# Patient Record
Sex: FEMALE | NOT HISPANIC OR LATINO | URBAN - METROPOLITAN AREA
[De-identification: names, ages, dates, MRNs, and addresses within clinical notes are randomized per-mention and may not be internally consistent; named-entity substitution may affect disease eponyms.]

---

## 2024-12-13 ENCOUNTER — APPOINTMENT (OUTPATIENT)
Dept: ORTHOPEDIC SURGERY | Facility: HOSPITAL | Age: 19
End: 2024-12-13

## 2024-12-16 ENCOUNTER — OFFICE VISIT (OUTPATIENT)
Dept: ORTHOPEDIC SURGERY | Facility: CLINIC | Age: 19
End: 2024-12-16
Payer: COMMERCIAL

## 2024-12-16 VITALS — WEIGHT: 165 LBS | HEIGHT: 69 IN | BODY MASS INDEX: 24.44 KG/M2

## 2024-12-16 DIAGNOSIS — S89.91XA PCL INJURY, RIGHT, INITIAL ENCOUNTER: Primary | ICD-10-CM

## 2024-12-16 PROCEDURE — 3008F BODY MASS INDEX DOCD: CPT | Performed by: ORTHOPAEDIC SURGERY

## 2024-12-16 PROCEDURE — 99204 OFFICE O/P NEW MOD 45 MIN: CPT | Performed by: ORTHOPAEDIC SURGERY

## 2024-12-16 PROCEDURE — 99214 OFFICE O/P EST MOD 30 MIN: CPT | Performed by: ORTHOPAEDIC SURGERY

## 2024-12-16 ASSESSMENT — PAIN - FUNCTIONAL ASSESSMENT: PAIN_FUNCTIONAL_ASSESSMENT: NO/DENIES PAIN

## 2024-12-17 NOTE — PROGRESS NOTES
PRIMARY CARE PHYSICIAN: Noemi Ortega MD  REFERRING PROVIDER: No referring provider defined for this encounter.     CONSULT ORTHOPAEDIC: Knee Evaluation      SUBJECTIVE  CHIEF COMPLAINT:   Chief Complaint   Patient presents with    Right Knee - Pain        HPI: Janna Bailon is a 19 y.o. patient presenting for a new patient evaluation.     Allyssa is currently in college at [employer], playing club soccer with chronic PCL tear that occurred in 2018.  She was evaluated that time and shared decision making used for conservative treatment for grade 2 PCL ligament injury.  She participated in physical therapy and was prescribed a right knee hinged brace.  She reports some discomfort at the end of a soccer game or after a soccer game in her medial anterior right knee.  She denies any interval injuries.  She denies any sensation of instability.  She denies any pain with daily activities.  She had an MRI performed a few months ago in [facility] (unavailable for review at today's visit) with her orthopedic surgeon (Ck Schilling) discussing degenerative medial meniscus changes.  Will need to discuss treatment options.  She is not currently doing any physical therapy or wearing her hinged knee brace with activities.    REVIEW OF SYSTEMS  Constitutional: See HPI for pain assessment, No significant weight loss, recent trauma  Cardiovascular: No chest pain, shortness of breath  Respiratory: No difficulty breathing, cough  Gastrointestinal: No nausea, vomiting, diarrhea, constipation  Musculoskeletal: Noted in HPI, positive for pain, restricted motion, stiffness  Integumentary: No rashes, easy bruising, redness   Neurological: no numbness or tingling in extremities, no gait disturbances   Psychiatric: No mood changes, memory changes, social issues  Heme/Lymph: no excessive swelling, easy bruising, excessive bleeding  ENT: No hearing changes  Eyes: No vision changes    Past Medical History:   Diagnosis Date     Effusion, right knee 06/06/2018    Effusion of right knee    Other specified health status     No pertinent past medical history    Pain in right knee 06/06/2018    Right knee pain    Personal history of other specified conditions 06/20/2019    History of shortness of breath    Sprain of posterior cruciate ligament of right knee, initial encounter 06/05/2018    Tear of PCL (posterior cruciate ligament) of knee, right, initial encounter    Sprain of posterior cruciate ligament of right knee, subsequent encounter 03/20/2019    Posterior cruciate tear, right, subsequent encounter    Sprain of unspecified site of right knee, initial encounter 04/17/2018    Right knee sprain        No Known Allergies     History reviewed. No pertinent surgical history.     No family history on file.     Social History     Socioeconomic History    Marital status: Single     Spouse name: Not on file    Number of children: Not on file    Years of education: Not on file    Highest education level: Not on file   Occupational History    Not on file   Tobacco Use    Smoking status: Never    Smokeless tobacco: Never   Substance and Sexual Activity    Alcohol use: Not on file    Drug use: Not on file    Sexual activity: Not on file   Other Topics Concern    Not on file   Social History Narrative    Not on file     Social Drivers of Health     Financial Resource Strain: Not on file   Food Insecurity: Not on file   Transportation Needs: Not on file   Physical Activity: Not on file   Stress: Not on file   Social Connections: Not on file   Intimate Partner Violence: Not on file   Housing Stability: Not on file        CURRENT MEDICATIONS:   No current outpatient medications on file.     No current facility-administered medications for this visit.        OBJECTIVE  PHYSICAL EXAM      3/6/2020     4:42 PM 6/29/2020     4:20 PM 8/7/2020     3:13 PM 7/23/2021     1:37 PM 8/3/2021     2:01 PM 12/20/2022    11:02 AM 12/16/2024     3:22 PM   Vitals  "  Systolic 108 100 120 115 110 111    Diastolic 72 64 76 74 70 67    Heart Rate    62      Temp   21.1 °C (70 °F) 36.6 °C (97.9 °F)      Height 1.753 m (5' 9\")  1.74 m (5' 8.5\") 1.74 m (5' 8.5\") 1.74 m (5' 8.5\") 1.727 m (5' 8\") 1.753 m (5' 9\")   Weight (lb) 162  165 162.6 165 160 165   BMI 23.92 kg/m2  24.72 kg/m2 24.36 kg/m2 24.72 kg/m2 24.33 kg/m2 24.37 kg/m2   BSA (m2) 1.89 m2  1.9 m2 1.89 m2 1.9 m2 1.87 m2 1.91 m2   Visit Report       Report      Body mass index is 24.37 kg/m².    19 y.o. year-old in no acute distress. Well nourished. Normal affect. Alert and oriented x 3.   Gait: Normal Tandem. Neutral alignment. No abnormalities of balance or coordination.  Right Knee:  ROM: 0-130 degrees. Negative crepitus.  No effusion.  Good quadriceps contraction. Intact extensor mechanism. Patellar tendon non-tender.  Patella facets non-tender. Negative apprehension. Negative tilt.   Lachman stable. Anterior drawer stable. Pivot negative. Posterior drawer positive.  Stable medial collateral ligament. Stable lateral collateral ligament.   Negative medial joint line tenderness.  Negative Erica's.  Negative lateral joint line tenderness.  Negative Erica's.      Motor Strength: 5 out of 5 in the bilateral lower extremities.  Neuro: L4-S1 sensation intact grossly bilaterally. No clonus. 2+ and symmetric Patella and Achilles bilateral reflexes.  Vascular: 2+ DP/PT pulses bilaterally. Bilateral lower extremity compartments supple.    Imaging:   MRI obtained this year in [place] unavailable for review at time of visit.    ASSESSMENT & PLAN    Impression: 19 y.o. female with chronic PCL tear without significant instability on exam.    Plan:   I explained to the patient the nature of their diagnosis.  I reviewed their imaging studies with them.  We discussed hinged knee brace, she had multiple hinged knee braces shown to her as options to offer additional support of knee to decrease discomfort she feels with competitive sports. We " discussed viscosupplementation injections as well as PRP injections as other conservative treatment modalities.  We discussed starting with conservative treatments and progressing through them prior to discussing surgery especially with no feelings of instability or current limitations at this time.  She will also start physical therapy.  We discussed can happily coordinate care with Dr. Schilling for when she returns to campus.    Follow-Up: Patient will follow-up if symptoms not improving with hinged knee brace or physical therapy for additional treatment modalities (supplementation/PRP).    At the end of the visit, all questions were answered in full. The patient is in agreement with the plan and recommendations. They will call the office with any questions/concerns.    Note dictated with Accela software. Completed without full typed error editing and sent to avoid delay.

## 2024-12-19 ENCOUNTER — APPOINTMENT (OUTPATIENT)
Dept: SPORTS MEDICINE | Facility: HOSPITAL | Age: 19
End: 2024-12-19
Payer: COMMERCIAL

## 2024-12-20 PROBLEM — S89.91XA: Status: ACTIVE | Noted: 2024-12-20

## 2024-12-30 ENCOUNTER — EVALUATION (OUTPATIENT)
Dept: PHYSICAL THERAPY | Facility: HOSPITAL | Age: 19
End: 2024-12-30
Payer: COMMERCIAL

## 2024-12-30 DIAGNOSIS — S89.91XA PCL INJURY, RIGHT, INITIAL ENCOUNTER: Primary | ICD-10-CM

## 2024-12-30 PROCEDURE — 97110 THERAPEUTIC EXERCISES: CPT | Mod: GP

## 2024-12-30 PROCEDURE — 97161 PT EVAL LOW COMPLEX 20 MIN: CPT | Mod: GP

## 2024-12-30 NOTE — PROGRESS NOTES
Physical Therapy  Physical Therapy Orthopedic Evaluation    Patient Name: Janna Bailon  MRN: [id number]  Today's Date: 12/30/2024  Time Calculation  Start Time: 1300  Stop Time: 1410  Time Calculation (min): 70 min    Insurance:  Visit number: 1 of 20  Authorization info: no auth needed  Insurance Type: anthem    General:  Reason for visit: chronic R knee pain  Referred by: Dr. Nair      Current Problem  1. PCL injury, right, initial encounter  Referral to Physical Therapy          Precautions: none       Medical History Form: Reviewed (scanned into chart)    Subjective:     Chief Complaint: Patient presents to clinic with chief complaint of chronic R Knee pain- initially injured in 2018 playing soccer, MRI confirmed PCL tear.involvement, was managed conservatively. She has pain in ant/med aspect of knee after playing soccer. Did have recent MRI out in [place] that shows atrophy of PCL and bone contusion.  States she wore a brace for a long time after hurting it and has been in/out of brace since then. She was having a lot of trouble over the summer with her knee in regards to pain and doing functional tasks- this led her to have the MRI.   Onset Date: 2018  IZABEL: running hurdles, trail leg got stuck and went down on leg    Current Condition:   Same    Pain: 0-6/10  Intermittent in nature  When pain increases to 6/10 only stays this high for a few minutes     Location: front of right knee, whole knee  Description: sharp when running, walking down stairs is dull aching  Aggravating Factors: Running, cutting, going down stairs (states she has been trying to run every day, does about 30 minutes interval runs from 1-7 minutes at a time). Has been running consistently for 2-3 weeks, a few days ago started noticing pain while running.  Relieving Factors:  hinge brace, rest,     Relevant Information (PMH & Previous Tests/Imaging): had MRI in [place]- has report with her- does show some chondral changes in medial  joint as well as chronic PCL tear. Low Iron. Takes Iron supplement and birth control.     Previous Interventions/Treatments: Physical Therapy for the knee in 2021, physical therapy for the ankle in 2023    Prior Level of Function (PLOF)  Patient previously independent with all ADLs  Exercise/Physical Activity: dance team , soccer, lifting  Work/School: [employer], club soccer, plays forward and center mid    Patients Living Environment: Reviewed and no concern    Primary Language: English    There are no spiritual/cultural practices/values/needs that are important to know    Patient's Goal(s) for Therapy: get back to a point where she can play a full soccer season    Red Flags: Do you have any of the following? No  Fever/chills, unexplained weight changes, dizziness/fainting, unexplained change in bowel or bladder functions, unexplained malaise or muscle weakness, night pain/sweats, numbness or tingling    Objective:  Objective       ROM    Knee AROM (Degrees)      (R)  (L)  Flexion: 128  135      Extension: 4  7         Knee PROM (Degrees)    WNL no pain bilaterally    Ankle AROM (Degrees)      (R)  (L)     Dorsiflexion: 10  10                 Strength Testing      Knee    (R)  (L)  Flexion: 4/5  5/5      Extension: 4+/5  5/5         Ankle    (R)  (L)  Plantarflexion: 5  5        Isometric Strength Testing    Quads @ 60 deg knee flexion:  R: 184 (115 % BW)  L: 161 (101 % BW)  Deficit: 13  LSI: 87    HS @ 60 deg knee flexion:  R: 70 (44 % BW)  L: 77 (48 % BW)  Deficit: 9  LSI: 91    HS:Quad Ratio:  R: 38  L: 47          Functional Screening  Squat: WNL, pain free  Lunge: WNL pain free  Lateral Step Down: WNL, pain free  SL Quarter Squat: WNL, pain free  DL jumping- WNL, painfree      Palpation: no pain with palpation      Flexibility: limited on R quad,       Patella Mobility: WNL      Ankle Joint Mobility: WNL      Observation: no noticeable swelling or effusion        Outcome Measures:  Other Measures  Lower Extremity  Funtional Score (LEFS): 76/80     EDUCATION: home exercise program, plan of care, activity modifications, pain management, and injury pathology       Goals: Set and discussed today  Active       PT Problem       PT Goal 1       Start:  12/30/24       Short Term Goals (4-6 weeks):    12/30/2024 Patient to demonstrate independence and compliance with HEP to facilitate progression of deficits and eventual quality self-management of condition.    2. 12/30/2024 Patient to demonstrate functional ROM of the hip, knee, ankle to ensure proper LE kinematics and facilitate return to premorbid level of function without pain.    3. 12/30/2024 Patient to demonstrate and verbalize appropriate LE transverse plane control with therapeutic interventions to ensure appropriate performance and decreased pain with functional and daily tasks.     Long Term Goals (6+ weeks):    4.   12/30/2024 Patient to demonstrate localized muscle strength of at least 5/5 to decrease joint stress and encourage appropriate force dynamics to allow for return to premorbid activities without pain.    5.  12/30/2024 Patient to improve functional outcome (LEFS) score by at least MCID (9pts) to demonstrate improved function with A/IADLs.    6.  12/30/2024 Patient able to return to premorbid level community ambulation without pain.    7.  12/30/2024 Patient able to return to premorbid level of recreational/sport activity, specifically running, without restrictions or complaint of pain.    8.  12/30/2024 Patient to understand and verbalize appropriate continued progression of activity to ensure safe return to full activity.               Plan of care was developed with input and agreement by the patient      Treatment Performed:      Therapeutic Exercise:    20 min  Discussion of walk to jog program  Discussion of HS strengthening exercises    PT Evaluation Time Entry  PT Evaluation (Low) Time Entry: 50    Assessment: Patient presents with signs and symptoms  consistent with chronic R knee pain, resulting in limited participation in pain-free ADLs and inability to perform at their prior level of function. Pt would benefit from physical therapy to address the impairments found & listed previously in the objective section in order to return to safe and pain-free ADLs and prior level of function.       Clinical Presentation: Stable and/or uncomplicated characteristics    Plan:     Planned Interventions include: therapeutic exercise, self-care home management, manual therapy, therapeutic activities, gait training, neuromuscular coordination, vasopneumatic, dry needling, aquatic therapy  Frequency: 1 x Week  Duration: 12 Weeks  Rehab Potential/Prognosis: Good      Meena Clinton, PT

## 2024-12-30 NOTE — LETTER
December 30, 2024    Meena Clinton PT  [address] [facility]  [facility] [address]    Patient: Janna Bailon   YOB: 2005   Date of Visit: 12/30/2024       Dear Anam Nair MD  [address]  [place] [address]    The attached plan of care is being sent to you because your patient’s medical reimbursement requires that you certify the plan of care. Your signature is required to allow uninterrupted insurance coverage.      You may indicate your approval by signing below and faxing this form back to us at Dept Fax: [phone].    Please call Dept: [phone] with any questions or concerns.    Thank you for this referral,        Meena Clinton PT  [facility]  [address] [facility] [id number] [phone]    Payer: Payor: ANTHEM / Plan: ANTHEM HMP / Product Type: *No Product type* /                                                                         Date:     Dear Meena Clinton PT,     Re: Ms. Janna Bailon, MRN:[id number]    I certify that I have reviewed the attached plan of care and it is medically necessary for Ms. Janna Bailon (2005) who is under my care.          ______________________________________                    _________________  Provider name and credentials                                           Date and time                                                                                           Plan of Care 12/30/24   Effective from: 12/30/2024  Effective to: 3/30/2025    Plan ID: [id number]            Participants as of Finalize on 12/30/2024    Name Type Comments Contact Info    Anam Nair MD Referring Provider  [phone]    Meena Clinton PT Physical Therapist  [phone]       Last Plan Note     Author: Meena Clinton PT Status: Incomplete Last edited: 12/30/2024  1:00 PM         Physical Therapy  Physical Therapy Orthopedic Evaluation    Patient  Name: Janna Bailon  MRN: [id number]  Today's Date: 12/30/2024  Time Calculation  Start Time: 1300  Stop Time: 1410  Time Calculation (min): 70 min    Insurance:  Visit number: 1 of 20  Authorization info: no auth needed  Insurance Type: anthem    General:  Reason for visit: chronic R knee pain  Referred by: Dr. Nair      Current Problem  1. PCL injury, right, initial encounter  Referral to Physical Therapy          Precautions: none       Medical History Form: Reviewed (scanned into chart)    Subjective:     Chief Complaint: Patient presents to clinic with chief complaint of chronic R Knee pain- initially injured in 2018 playing soccer, MRI confirmed PCL tear.involvement, was managed conservatively. She has pain in ant/med aspect of knee after playing soccer. Did have recent MRI out in [place] that shows atrophy of PCL and bone contusion.  States she wore a brace for a long time after hurting it and has been in/out of brace since then. She was having a lot of trouble over the summer with her knee in regards to pain and doing functional tasks- this led her to have the MRI.   Onset Date: 2018  IZABEL: running hurdles, trail leg got stuck and went down on leg    Current Condition:   Same    Pain: 0-6/10  Intermittent in nature  When pain increases to 6/10 only stays this high for a few minutes     Location: front of right knee, whole knee  Description: sharp when running, walking down stairs is dull aching  Aggravating Factors: Running, cutting, going down stairs (states she has been trying to run every day, does about 30 minutes interval runs from 1-7 minutes at a time). Has been running consistently for 2-3 weeks, a few days ago started noticing pain while running.  Relieving Factors:  hinge brace, rest,     Relevant Information (PMH & Previous Tests/Imaging): had MRI in [place]- has report with her- does show some chondral changes in medial joint as well as chronic PCL tear. Low Iron. Takes Iron supplement  and birth control.     Previous Interventions/Treatments: Physical Therapy for the knee in 2021, physical therapy for the ankle in 2023    Prior Level of Function (PLOF)  Patient previously independent with all ADLs  Exercise/Physical Activity: dance team , soccer, lifting  Work/School: [employer], club soccer, plays forward and center mid    Patients Living Environment: Reviewed and no concern    Primary Language: English    There are no spiritual/cultural practices/values/needs that are important to know    Patient's Goal(s) for Therapy: get back to a point where she can play a full soccer season    Red Flags: Do you have any of the following? No  Fever/chills, unexplained weight changes, dizziness/fainting, unexplained change in bowel or bladder functions, unexplained malaise or muscle weakness, night pain/sweats, numbness or tingling    Objective:  Objective      ROM    Knee AROM (Degrees)      (R)  (L)  Flexion: 128  135      Extension: 4  7         Knee PROM (Degrees)    WNL no pain bilaterally    Ankle AROM (Degrees)      (R)  (L)     Dorsiflexion: 10  10                 Strength Testing      Knee    (R)  (L)  Flexion: 4/5  5/5      Extension: 4+/5  5/5         Ankle    (R)  (L)  Plantarflexion: 5  5        Isometric Strength Testing    Quads @ 60 deg knee flexion:  R: 184 (115 % BW)  L: 161 (101 % BW)  Deficit: 13  LSI: 87    HS @ 60 deg knee flexion:  R: 70 (44 % BW)  L: 77 (48 % BW)  Deficit: 9  LSI: 91    HS:Quad Ratio:  R: 38  L: 47          Functional Screening  Squat: WNL, pain free  Lunge: WNL pain free  Lateral Step Down: WNL, pain free  SL Quarter Squat: WNL, pain free  DL jumping- WNL, painfree      Palpation: no pain with palpation      Flexibility: limited on R quad,       Patella Mobility: WNL      Ankle Joint Mobility: WNL      Observation: no noticeable swelling or effusion        Outcome Measures:  Other Measures  Lower Extremity Funtional Score (LEFS): 76/80     EDUCATION: home exercise program,  plan of care, activity modifications, pain management, and injury pathology       Goals: Set and discussed today  Active       PT Problem       PT Goal 1       Start:  12/30/24       Short Term Goals (4-6 weeks):    12/30/2024 Patient to demonstrate independence and compliance with HEP to facilitate progression of deficits and eventual quality self-management of condition.    2. 12/30/2024 Patient to demonstrate functional ROM of the hip, knee, ankle to ensure proper LE kinematics and facilitate return to premorbid level of function without pain.    3. 12/30/2024 Patient to demonstrate and verbalize appropriate LE transverse plane control with therapeutic interventions to ensure appropriate performance and decreased pain with functional and daily tasks.     Long Term Goals (6+ weeks):    4.   12/30/2024 Patient to demonstrate localized muscle strength of at least 5/5 to decrease joint stress and encourage appropriate force dynamics to allow for return to premorbid activities without pain.    5.  12/30/2024 Patient to improve functional outcome (LEFS) score by at least MCID (9pts) to demonstrate improved function with A/IADLs.    6.  12/30/2024 Patient able to return to premorbid level community ambulation without pain.    7.  12/30/2024 Patient able to return to premorbid level of recreational/sport activity, specifically running, without restrictions or complaint of pain.    8.  12/30/2024 Patient to understand and verbalize appropriate continued progression of activity to ensure safe return to full activity.               Plan of care was developed with input and agreement by the patient      Treatment Performed:      Therapeutic Exercise:    20 min  Discussion of walk to jog program  Discussion of HS strengthening exercises    PT Evaluation Time Entry  PT Evaluation (Low) Time Entry: 50    Assessment: Patient presents with signs and symptoms consistent with chronic R knee pain, resulting in limited participation  in pain-free ADLs and inability to perform at their prior level of function. Pt would benefit from physical therapy to address the impairments found & listed previously in the objective section in order to return to safe and pain-free ADLs and prior level of function.       Clinical Presentation: Stable and/or uncomplicated characteristics    Plan:     Planned Interventions include: therapeutic exercise, self-care home management, manual therapy, therapeutic activities, gait training, neuromuscular coordination, vasopneumatic, dry needling, aquatic therapy  Frequency: 1 x Week  Duration: 12 Weeks  Rehab Potential/Prognosis: Good      Meena Clinton PT           Current Participants as of 12/30/2024    Name Type Comments Contact Info    Anam Nair MD Referring Provider  [phone]    Signature pending    Meena Clinton PT Physical Therapist  [phone]    Electronically signed by Meena Clinton PT at 12/30/2024 1208 EST

## 2025-01-02 ENCOUNTER — TREATMENT (OUTPATIENT)
Dept: PHYSICAL THERAPY | Facility: HOSPITAL | Age: 20
End: 2025-01-02
Payer: COMMERCIAL

## 2025-01-02 DIAGNOSIS — S89.91XA PCL INJURY, RIGHT, INITIAL ENCOUNTER: Primary | ICD-10-CM

## 2025-01-02 PROCEDURE — 97530 THERAPEUTIC ACTIVITIES: CPT | Mod: GP

## 2025-01-02 PROCEDURE — 97110 THERAPEUTIC EXERCISES: CPT | Mod: GP

## 2025-01-02 NOTE — PROGRESS NOTES
Physical Therapy  Physical Therapy Treatment Note    Patient Name: Janna Bailon  MRN: [id number]  Today's Date: 1/2/2025  Time Calculation  Start Time: 1330  Stop Time: 1430  Time Calculation (min): 60 min    Insurance:  Visit number: 2 of 20  Authorization info: no auth needed  Insurance Type: anthem    General:  Reason for visit: chronic R knee pain  Referred by: Dr. Nair    Current Problem  1. PCL injury, right, initial encounter            Precautions: none      Subjective:     Visit #2: Patient reports compliance with walk to jog program, walking 2, jogging 3 for 4 rounds through. Denies pain coming into today's session- no pain with walk to jog program. Has done it twice since Monday.    Pain   0/10    Performing HEP?: Yes      Objective:     Isometric Strength Testing     Quads @ 60 deg knee flexion:  R: 184 (115 % BW)  L: 161 (101 % BW)  Deficit: 13  LSI: 87     HS @ 60 deg knee flexion:  R: 70 (44 % BW)  L: 77 (48 % BW)  Deficit: 9  LSI: 91     HS:Quad Ratio:  R: 38  L: 47    Isokinetic Strength Testing    Peak Torque Quads @ 60 deg/sec (goal for males: 100-125%, females: %)  R: 175 (109 % BW)  L: 164 (102 % BW)  Deficit: 6  LSI: 94    Peak Torque HS @ 60 deg/sec (goals for males: 60% BW, females: 50%)  R: 89 (56 % BW)  L: 92 (58 % BW)  Deficit: 3   LSI: 97    HS:Quad Ratio @ 60 deg/s (goals for males: 65%, females: 75%)  R: 51  L: 56    Peak Torque Quads @ 180 deg/sec  R: 103 (64 % BW)  L: 112 (70 % BW)  Deficit: 8  LSI: 92    Peak Torque HS @ 180 deg/sec  R: 71 (44 % BW)  L: 71 (44 % BW)  Deficit: 0  LSI: 100     HS:Quad Ratio @ 180 deg/sec  R: 69  L: 63    Force Plate Counter Movement Jump     Pass: Yes    Metric (passing LSI/score) Score Pass   L/R Propulsive Impulse Index LSI (>=95%)   95 Yes   L/R Braking Impulse Index LSI (>=90%)   85 No   [place] Force LSI (>=90%)   99 Yes   Peak breaking velocity (<=-1.2 (m/s))                   Treatment Performed:    Therapeutic Exercise:    35  min  Upright bike 5 minutes  Side steps with BTB, monster walks BTB 2x10 steps  SL fire hydrant BTB 3x10 each leg  Dynamic warm up  DL knee ext/DL knee flex 3x10  Continued progression through walk to jog program        Therapeutic Activity:    25 min  Isokinetic strength testing  Force plate analysis          Assessment:   Janna Bailon is progressing towards their goals as evidenced by meeting all objective data requirements- progressing through walk to jog program without pain. Today we assessed isokinetic strength and force plate metrics during DL CMJ. Overall she is >90% symmetry on all metrics with force and function. Biggest deficit was braking force and actually weaker on L than R when doing braking force. Discussed continued progression through walk to jog program and adding eccentric strenghtening into regime. Told to hold off on full go practice/games until made it through whole walk to jog program. Going back to college at [employer] for club soccer, gave card for email. Does not need further PT at this time. Educated on what she needs to be doing outside of here.     Patient would continue to benefit from skilled PT to address remaining functional, objective and subjective deficits to allow them to return to full independence with ADLs and iADLs.      Plan:  CAROL Clinton, PT

## 2025-01-06 ENCOUNTER — APPOINTMENT (OUTPATIENT)
Dept: PHYSICAL THERAPY | Facility: HOSPITAL | Age: 20
End: 2025-01-06
Payer: COMMERCIAL

## 2025-01-08 ENCOUNTER — APPOINTMENT (OUTPATIENT)
Dept: PHYSICAL THERAPY | Facility: HOSPITAL | Age: 20
End: 2025-01-08
Payer: COMMERCIAL